# Patient Record
Sex: MALE | Race: WHITE | Employment: STUDENT | URBAN - METROPOLITAN AREA
[De-identification: names, ages, dates, MRNs, and addresses within clinical notes are randomized per-mention and may not be internally consistent; named-entity substitution may affect disease eponyms.]

---

## 2018-12-26 ENCOUNTER — HOSPITAL ENCOUNTER (EMERGENCY)
Facility: CLINIC | Age: 23
Discharge: HOME | End: 2018-12-27
Attending: EMERGENCY MEDICINE | Admitting: EMERGENCY MEDICINE
Payer: COMMERCIAL

## 2018-12-26 ENCOUNTER — APPOINTMENT (OUTPATIENT)
Dept: GENERAL RADIOLOGY | Facility: CLINIC | Age: 23
End: 2018-12-26
Attending: EMERGENCY MEDICINE
Payer: COMMERCIAL

## 2018-12-26 ENCOUNTER — APPOINTMENT (OUTPATIENT)
Dept: ULTRASOUND IMAGING | Facility: CLINIC | Age: 23
End: 2018-12-26
Attending: EMERGENCY MEDICINE
Payer: COMMERCIAL

## 2018-12-26 DIAGNOSIS — R10.84 ABDOMINAL PAIN, GENERALIZED: ICD-10-CM

## 2018-12-26 DIAGNOSIS — R19.7 VOMITING AND DIARRHEA: ICD-10-CM

## 2018-12-26 DIAGNOSIS — E86.0 MILD DEHYDRATION: ICD-10-CM

## 2018-12-26 DIAGNOSIS — R11.10 VOMITING AND DIARRHEA: ICD-10-CM

## 2018-12-26 LAB
ALBUMIN SERPL-MCNC: 4.6 G/DL (ref 3.4–5)
ALP SERPL-CCNC: 111 U/L (ref 40–150)
ALT SERPL W P-5'-P-CCNC: 31 U/L (ref 0–70)
ANION GAP SERPL CALCULATED.3IONS-SCNC: 7 MMOL/L (ref 3–14)
AST SERPL W P-5'-P-CCNC: 23 U/L (ref 0–45)
BASOPHILS # BLD AUTO: 0 10E9/L (ref 0–0.2)
BASOPHILS NFR BLD AUTO: 0.2 %
BILIRUB DIRECT SERPL-MCNC: 0.3 MG/DL (ref 0–0.2)
BILIRUB SERPL-MCNC: 2.3 MG/DL (ref 0.2–1.3)
BUN SERPL-MCNC: 18 MG/DL (ref 7–30)
CALCIUM SERPL-MCNC: 9.5 MG/DL (ref 8.5–10.1)
CHLORIDE SERPL-SCNC: 102 MMOL/L (ref 94–109)
CO2 SERPL-SCNC: 29 MMOL/L (ref 20–32)
CREAT SERPL-MCNC: 0.96 MG/DL (ref 0.66–1.25)
DIFFERENTIAL METHOD BLD: ABNORMAL
EOSINOPHIL # BLD AUTO: 0.1 10E9/L (ref 0–0.7)
EOSINOPHIL NFR BLD AUTO: 1.1 %
ERYTHROCYTE [DISTWIDTH] IN BLOOD BY AUTOMATED COUNT: 11.9 % (ref 10–15)
GFR SERPL CREATININE-BSD FRML MDRD: >90 ML/MIN/{1.73_M2}
GLUCOSE SERPL-MCNC: 139 MG/DL (ref 70–99)
HCT VFR BLD AUTO: 51.4 % (ref 40–53)
HGB BLD-MCNC: 18.2 G/DL (ref 13.3–17.7)
IMM GRANULOCYTES # BLD: 0 10E9/L (ref 0–0.4)
IMM GRANULOCYTES NFR BLD: 0.2 %
LIPASE SERPL-CCNC: 76 U/L (ref 73–393)
LYMPHOCYTES # BLD AUTO: 0.4 10E9/L (ref 0.8–5.3)
LYMPHOCYTES NFR BLD AUTO: 3.9 %
MCH RBC QN AUTO: 31.9 PG (ref 26.5–33)
MCHC RBC AUTO-ENTMCNC: 35.4 G/DL (ref 31.5–36.5)
MCV RBC AUTO: 90 FL (ref 78–100)
MONOCYTES # BLD AUTO: 0.8 10E9/L (ref 0–1.3)
MONOCYTES NFR BLD AUTO: 7.4 %
NEUTROPHILS # BLD AUTO: 8.8 10E9/L (ref 1.6–8.3)
NEUTROPHILS NFR BLD AUTO: 87.2 %
NRBC # BLD AUTO: 0 10*3/UL
NRBC BLD AUTO-RTO: 0 /100
PLATELET # BLD AUTO: 168 10E9/L (ref 150–450)
POTASSIUM SERPL-SCNC: 4.1 MMOL/L (ref 3.4–5.3)
PROT SERPL-MCNC: 8 G/DL (ref 6.8–8.8)
RBC # BLD AUTO: 5.7 10E12/L (ref 4.4–5.9)
SODIUM SERPL-SCNC: 138 MMOL/L (ref 133–144)
TROPONIN I SERPL-MCNC: <0.015 UG/L (ref 0–0.04)
WBC # BLD AUTO: 10.1 10E9/L (ref 4–11)

## 2018-12-26 PROCEDURE — 71046 X-RAY EXAM CHEST 2 VIEWS: CPT

## 2018-12-26 PROCEDURE — 99285 EMERGENCY DEPT VISIT HI MDM: CPT | Mod: 25

## 2018-12-26 PROCEDURE — 80053 COMPREHEN METABOLIC PANEL: CPT | Performed by: EMERGENCY MEDICINE

## 2018-12-26 PROCEDURE — 84484 ASSAY OF TROPONIN QUANT: CPT | Performed by: EMERGENCY MEDICINE

## 2018-12-26 PROCEDURE — 83690 ASSAY OF LIPASE: CPT | Performed by: EMERGENCY MEDICINE

## 2018-12-26 PROCEDURE — 85025 COMPLETE CBC W/AUTO DIFF WBC: CPT | Performed by: EMERGENCY MEDICINE

## 2018-12-26 PROCEDURE — 25000128 H RX IP 250 OP 636: Performed by: EMERGENCY MEDICINE

## 2018-12-26 PROCEDURE — 93005 ELECTROCARDIOGRAM TRACING: CPT

## 2018-12-26 PROCEDURE — 96361 HYDRATE IV INFUSION ADD-ON: CPT

## 2018-12-26 PROCEDURE — 76705 ECHO EXAM OF ABDOMEN: CPT

## 2018-12-26 PROCEDURE — 82248 BILIRUBIN DIRECT: CPT | Performed by: EMERGENCY MEDICINE

## 2018-12-26 RX ORDER — SODIUM CHLORIDE 9 MG/ML
INJECTION, SOLUTION INTRAVENOUS CONTINUOUS
Status: DISCONTINUED | OUTPATIENT
Start: 2018-12-26 | End: 2018-12-27 | Stop reason: HOSPADM

## 2018-12-26 RX ORDER — ONDANSETRON 2 MG/ML
4 INJECTION INTRAMUSCULAR; INTRAVENOUS ONCE
Status: COMPLETED | OUTPATIENT
Start: 2018-12-26 | End: 2018-12-26

## 2018-12-26 RX ORDER — SODIUM CHLORIDE 9 MG/ML
1000 INJECTION, SOLUTION INTRAVENOUS CONTINUOUS
Status: DISCONTINUED | OUTPATIENT
Start: 2018-12-26 | End: 2018-12-27 | Stop reason: HOSPADM

## 2018-12-26 RX ADMIN — ONDANSETRON 4 MG: 2 INJECTION INTRAMUSCULAR; INTRAVENOUS at 21:13

## 2018-12-26 RX ADMIN — SODIUM CHLORIDE 1000 ML: 9 INJECTION, SOLUTION INTRAVENOUS at 22:15

## 2018-12-26 RX ADMIN — SODIUM CHLORIDE 1000 ML: 9 INJECTION, SOLUTION INTRAVENOUS at 21:30

## 2018-12-26 ASSESSMENT — ENCOUNTER SYMPTOMS
SORE THROAT: 1
SHORTNESS OF BREATH: 0
DIARRHEA: 1
VOMITING: 1
NAUSEA: 1
COLOR CHANGE: 1
HEADACHES: 1

## 2018-12-26 NOTE — ED AVS SNAPSHOT
[facility] Emergency Department  [address]  [facility] [address]  Phone:  [phone]  Fax:  [phone]                                    Blayne Encarnacion   MRN: [id number]    Department:  [facility] Emergency Department   Date of Visit:  12/26/2018           After Visit Summary Signature Page    I have received my discharge instructions, and my questions have been answered. I have discussed any challenges I see with this plan with the nurse or doctor.    ..........................................................................................................................................  Patient/Patient Representative Signature      ..........................................................................................................................................  Patient Representative Print Name and Relationship to Patient    ..................................................               ................................................  Date                                   Time    ..........................................................................................................................................  Reviewed by Signature/Title    ...................................................              ..............................................  Date                                               Time          22EPIC Rev 08/18

## 2018-12-27 VITALS
HEART RATE: 97 BPM | RESPIRATION RATE: 20 BRPM | TEMPERATURE: 98.2 F | DIASTOLIC BLOOD PRESSURE: 47 MMHG | SYSTOLIC BLOOD PRESSURE: 113 MMHG | OXYGEN SATURATION: 97 %

## 2018-12-27 LAB — INTERPRETATION ECG - MUSE: NORMAL

## 2018-12-27 PROCEDURE — 96374 THER/PROPH/DIAG INJ IV PUSH: CPT

## 2018-12-27 PROCEDURE — 96361 HYDRATE IV INFUSION ADD-ON: CPT

## 2018-12-27 PROCEDURE — 25000128 H RX IP 250 OP 636: Performed by: EMERGENCY MEDICINE

## 2018-12-27 RX ORDER — ONDANSETRON 2 MG/ML
INJECTION INTRAMUSCULAR; INTRAVENOUS
Status: DISCONTINUED
Start: 2018-12-27 | End: 2018-12-27 | Stop reason: DRUGHIGH

## 2018-12-27 RX ORDER — ONDANSETRON 4 MG/1
4 TABLET, ORALLY DISINTEGRATING ORAL EVERY 8 HOURS PRN
Qty: 12 TABLET | Refills: 0 | Status: SHIPPED | OUTPATIENT
Start: 2018-12-27

## 2018-12-27 RX ORDER — ONDANSETRON 2 MG/ML
4 INJECTION INTRAMUSCULAR; INTRAVENOUS ONCE
Status: DISCONTINUED | OUTPATIENT
Start: 2018-12-27 | End: 2018-12-27 | Stop reason: HOSPADM

## 2018-12-27 RX ADMIN — SODIUM CHLORIDE 1000 ML: 9 INJECTION, SOLUTION INTRAVENOUS at 00:07

## 2018-12-27 NOTE — ED PROVIDER NOTES
"  History     Chief Complaint:  Abdominal Pain, Vomiting    HPI   Blayne Encarnacion is a 23 year old male with a history of *** who presents to the emergency department for evaluation of abdominal pain and vomiting.     Allergies:  NKDA     Medications:    The patient is currently on no regular medications.    Past Medical History:    The patient denies any significant past medical history.    Past Surgical History:    The patient does not have any pertinent past surgical history.    Family History:    No past pertinent family history.    Social History:  Presents ***.  Marital Status:  Single [1]     Review of Systems  ***    Physical Exam     Patient Vitals for the past 24 hrs:   BP Temp Temp src Pulse Resp SpO2   12/26/18 2051 -- 98.2  F (36.8  C) Oral -- -- --   12/26/18 2048 130/63 -- -- 100 20 99 %     Physical Exam  ***    Emergency Department Course   ECG:  Time: ***  Vent. Rate *** bpm. CA interval ***. QRS duration ***. QT/QTc ***. P-R-T axis *** *** ***. *** Read time: ***    Imaging:  {Radiographic findings?:[id number]::\" \"}  ***    Laboratory:  CBC: WBC: 10.1, HGB: 18.2 (H), PLT: 168  CMP: Glucose 139 (H), Bilirubin Total 2.3 (H), o/w WNL (Creatinine: 0.96)    Lipase: 76    Procedures:  ***        Interventions:  ***  2113 Zofran 4 mg PO  Medications   sodium chloride 0.9% infusion (not administered)   ondansetron (ZOFRAN) oral solution (inj used orally) 4 mg (4 mg Oral Given 12/26/18 2113)       Emergency Department Course:  Nursing notes and vitals reviewed. *** I performed an exam of the patient as documented above.     EKG obtained in the ED, see results above.     IV inserted. Medicine administered as documented above. Blood drawn. This was sent to the lab for further testing, results above.    The patient was sent for a *** while in the emergency department, findings above.     *** I rechecked the patient and discussed the results of his workup thus far.     Findings and plan explained to the {PATIENT, " "FAMILY MEMBER, CAREGIVER:[id number]}. Patient discharged home with instructions regarding supportive care, medications, and reasons to return. The importance of close follow-up was reviewed. The patient was prescribed ***    I personally reviewed the laboratory results with the {PATIENT, FAMILY MEMBER, CAREGIVER:[id number]} and answered all related questions prior to discharge. ***    Impression & Plan       {trauma activation?:376050::\" \"}  CMS Diagnoses: {Sepsis/Septic Shock/Stemi/Stroke:241987::\" \"}       Medical Decision Making:  ***  Critical Care time:  {none or minutes:303085::\"none\"}    Diagnosis:  No diagnosis found.    Disposition:  {discharged to home/discharged to home with.../Admitted to...:430192}    Discharge Medications:     Medication List      There are no discharge medications for this visit.       ***    Oscar Buckner  12/26/2018   [facility] EMERGENCY DEPARTMENT    "

## 2018-12-27 NOTE — ED PROVIDER NOTES
History     Chief Complaint:  Abdominal Pain and Vomiting    HPI   Blayne Encarnacion is a 23 year old male who presents to the emergency department today for evaluation of abdominal pain and vomiting. The patient's friend reports the patient started vomiting, having diarrhea, and shaking this evening within 4 hours to arrival. She reports the patient ate dinner and went to go workout when he suddenly felt ill. She states her daughter was in the ER last night, 12/25/18, with similar symptoms. She denies fevers. The patient endorses a sore throat, chest pain, and headache. The patient denies shortness of breath      Allergies:  No Known Drug Allergies    Medications:    Medications reviewed. No current medications.     Past Medical History:    Asthma     Past Surgical History:    Surgical history reviewed. No pertinent surgical history.    Family History:    Family history reviewed. No pertinent family history.      Social History:  The patient was accompanied to the ED by a friend.  Smoking Status: Never Smoker  Smokeless Tobacco: Never Used  Alcohol Use: Negative      Review of Systems   HENT: Positive for sore throat.    Respiratory: Negative for shortness of breath.    Cardiovascular: Positive for chest pain.   Gastrointestinal: Positive for diarrhea, nausea and vomiting.   Skin: Positive for color change and pallor.   Neurological: Positive for headaches.   All other systems reviewed and are negative.      Physical Exam     Patient Vitals for the past 24 hrs:   BP Temp Temp src Pulse Resp SpO2   12/26/18 2051 -- 98.2  F (36.8  C) Oral -- -- --   12/26/18 2048 130/63 -- -- 100 20 99 %       Physical Exam  General: Resting on the bed.  Head: No obvious trauma to head.  Ears, Nose, Throat:  External ears normal.  Nose normal.  Dry MM  Eyes:  Conjunctivae clear.  Pupils are equal, round, and reactive.   Neck: Normal range of motion.  Neck supple.   CV: Regular rate and rhythm.  No murmurs.      Respiratory: Effort normal  and breath sounds normal.  No wheezing or crackles.   Gastrointestinal: Soft.  No distension. There is mild epigastric tenderness.  There is no rigidity, no rebound and no guarding.   Skin: Skin is warm and dry.  No rash noted.     Emergency Department Course     ECG:  ECG taken at   Normal sinus rhythm  Normal ECG  Rate 78 bpm. RI interval 168 ms. QRS duration 100 ms. QT/QTc 374/426 ms. P-R-T axes 59 14 54.    Imaging:  Radiology findings were communicated with the patient who voiced understanding of the findings.    US Abdomen Limited (RUQ)  Normal right upper quadrant. No gallstone or biliary  dilatation.  Reading per radiology    XR Chest 2 Views  No active cardiopulmonary disease.  JONATHAN ECKERT MD  Reading per radiology    Laboratory:  Laboratory findings were communicated with the patient who voiced understanding of the findings.    CBC: WBC 10.1, HGB 18.2,   CMP: glucose 139,  Bilirubin 2.3, o/w WNL (Creatinine 0.96)  Lipase: 76  Troponin (Collected ): <0.015  Bilirubin direct: 0.3     Interventions:   Zofran 4 mg PO  2215 NS 1000 mL IV  2342 NS 1000 mL IV  0005 Zofran 4 mg IV  0007 NS 1000 mL IV    Emergency Department Course:     IV was inserted and blood was drawn for laboratory testing, results above.      EKG obtained as noted above.      Nursing notes and vitals reviewed.    9 I performed an exam of the patient as documented above.     2204 The patient was sent for a chest x-ray while in the emergency department, results above.      2316 The patient was sent for a abdomen US while in the emergency department, results above.      0030 I personally reviewed the laboratory and imaging results with the patient and answered all related questions prior to discharge.    Impression & Plan      Medical Decision Makin-year-old male otherwise healthy presents with vomiting, diarrhea, abdominal cramping.  Vital signs largely unremarkable.  Broad differential pursued  including but not limited to dehydration, gastroenteritis, electrolyte metabolic renal dysfunction, cholecystitis, cholangitis, pancreatitis, pneumonia, pneumothorax, acute coronary syndrome, etc.  Overall patient is well-appearing nontoxic.  He does look like he feels ill.  Considered meningitis or encephalitis but patient does not have any severe headache, neck pain, fevers.  Do not suspect meningitis or tonsillitis.  No indication for LP.CBC shows no leukocytosis and hemoglobin appears hemoconcentrated at 18.2.  BMP shows no acute electrolyte metabolic or renal dysfunction.  LFTs normal for elevated T bili, seems to be largely indirect.  Remainder unremarkable.  Does not appear to be consistent with obstructive pathology, hepatitis with normal lipase does not appear concerning for pancreatitis.  Suspect this is likely secondary to his dehydration.  Ultrasound of the right upper quadrant was obtained showing no gallstones or biliary dilatation or evidence of cholecystitis.  EKG shows sinus rhythm no acute ST-T wave changes.  Troponin negative.  He reports that his chest pain only started after he has been vomiting.  This likely appears to be acid reflux secondary to his vomiting.  Chest x-ray shows no pneumothorax, effusion, pneumonia.  Patient otherwise is mostly nauseated but not had any active vomiting or diarrhea in the emergency department.  No indication for emergent CT imaging in the emergency department.  He does appear to be dehydrated.  Patient was given IV fluids.  P.o. challenge.  Advised good supportive cares and close follow-up with primary care doctor.  Strict return precautions were discussed and he was discharged in stable improved condition.    Diagnosis:      ICD-10-CM    1. Vomiting and diarrhea R11.10     R19.7    2. Mild dehydration E86.0    3. Abdominal pain, generalized R10.84      Disposition:   The patient is discharged to home.     Discharge Medications:     Review of your medicines       You have not been prescribed any medications.          Scribe Disclosure:  I, Yaritza Morgancarolee, am serving as a scribe at 9:26 PM on 12/26/2018 to document services personally performed by Nessa Byrd MD based on my observations and the provider's statements to me.     [facility] EMERGENCY DEPARTMENT       Nessa Byrd MD  12/27/18 0137

## 2018-12-27 NOTE — DISCHARGE INSTRUCTIONS
Please keep hydrated with lots of fluids.  May use zofran as needed for nausea and vomiting.  If you are unable to keep fluids down, unable to urinate, lightheaded or dizzy, develop abdominal pain, fevers not responsive to tylenol/ibuprofen or other acute changes return to the ED.  Otherwise please follow up with your PCP in 2-3 days for a recheck.      Discharge Instructions  Gastroenteritis    You have been seen today for vomiting (throwing up) and diarrhea (loose stools), called gastroenteritis or the stomach flu. This is usually caused by a virus, but some bacteria, parasites, medicines or other medical conditions can cause similar symptoms. At this time your provider does not find that your vomiting and diarrhea is a sign of anything dangerous or life-threatening.  However, sometimes the signs of serious illness do not show up right away. Remember that serious problems like appendicitis can look like gastroenteritis at first.       Generally, every Emergency Department visit should have a follow-up clinic visit with either a primary or a specialty clinic/provider. Please follow-up as instructed by your emergency provider today.    Return to the Emergency Department if:  You keep vomiting and you are not able to keep liquids down.  You feel you are getting dehydrated, such as being very thirsty, not urinating (peeing), or feeling faint or lightheaded.   You develop a new fever.  You have abdominal (belly) pain that seems worse than cramps, is in one spot, or is getting worse over time.   You have blood in your vomit or in your diarrhea.  You feel very weak.    What can I do to help myself?  The most important thing to do is to drink clear liquids.  If you have been vomiting a lot, it is best to have only small, frequent sips of liquids.  Drinking too much at once may cause more vomiting. Water is a good first option for rehydration. If you are vomiting often, you must also replace electrolytes (salts and  minerals) lost with your illness. Pedialyte  is the best rehydration liquid but many don?t like the taste so sports drinks (like Gatorade ) are a good option. Sodas and juice are also options but are high in sugar. Avoid acid liquids (orange), caffeine (coffee) or alcohol. Do not drink milk until you no longer have diarrhea.  After liquids are staying down, you may start eating mild foods. Soda crackers, toast, plain noodles, gelatin, applesauce and bananas are good first choices.  Avoid foods that have acid, are spicy, fatty or fibrous (such as meats, coarse grains, vegetables). You may start eating these foods again in about 3 days when you are better.  Sometimes treatment includes prescription medicine to prevent nausea (sick to your stomach) and vomiting and to prevent diarrhea. If your provider prescribes these for you, take them as directed.  Nonprescription medicine is available for the treatment of diarrhea and can be very effective.  If you use it, make sure you use the dose recommended on the package. Avoid Lomotil . Check with your healthcare provider before you use any medicine for diarrhea.  Do not take ibuprofen, or other nonsteroidal anti-inflammatory medicines without checking with your healthcare provider.  If you were given a prescription for medicine here today, be sure to read all of the information (including the package insert) that comes with your prescription.  This will include important information about the medicine, its side effects, and any warnings that you need to know about.  The pharmacist who fills the prescription can provide more information and answer questions you may have about the medicine.  If you have questions or concerns that the pharmacist cannot address, please call or return to the Emergency Department.   Remember that you can always come back to the Emergency Department if you are not able to see your regular provider in the amount of time listed above, if you get any  new symptoms, or if there is anything that worries you.

## 2018-12-27 NOTE — ED TRIAGE NOTES
Abdominal pain , diarrhea and vomiting today. Patient states he feels very weak and dizzy. Pale. ABC intact alert and no distress.

## 2018-12-27 NOTE — ED NOTES
Presents less lethargic, A&O x 4 with VSS on RA. Tolerating oral liquids well, 3rd L of IVF infused. Plan to road test before discharge.